# Patient Record
Sex: MALE | URBAN - METROPOLITAN AREA
[De-identification: names, ages, dates, MRNs, and addresses within clinical notes are randomized per-mention and may not be internally consistent; named-entity substitution may affect disease eponyms.]

---

## 2024-05-12 SDOH — HEALTH STABILITY: PHYSICAL HEALTH: ON AVERAGE, HOW MANY MINUTES DO YOU ENGAGE IN EXERCISE AT THIS LEVEL?: 0 MIN

## 2024-05-12 SDOH — HEALTH STABILITY: PHYSICAL HEALTH: ON AVERAGE, HOW MANY DAYS PER WEEK DO YOU ENGAGE IN MODERATE TO STRENUOUS EXERCISE (LIKE A BRISK WALK)?: 0 DAYS

## 2024-05-15 ENCOUNTER — OFFICE VISIT (OUTPATIENT)
Dept: FAMILY MEDICINE CLINIC | Facility: CLINIC | Age: 69
End: 2024-05-15
Payer: MEDICARE

## 2024-05-15 VITALS
TEMPERATURE: 98 F | HEART RATE: 84 BPM | DIASTOLIC BLOOD PRESSURE: 62 MMHG | BODY MASS INDEX: 25.31 KG/M2 | RESPIRATION RATE: 14 BRPM | SYSTOLIC BLOOD PRESSURE: 102 MMHG | WEIGHT: 191 LBS | OXYGEN SATURATION: 98 % | HEIGHT: 73 IN

## 2024-05-15 DIAGNOSIS — S24.103S SPINAL CORD INJURY AT T9 LEVEL, SEQUELA (HCC): ICD-10-CM

## 2024-05-15 DIAGNOSIS — S32.001S CLOSED BURST FRACTURE OF LUMBAR VERTEBRA, SEQUELA: ICD-10-CM

## 2024-05-15 DIAGNOSIS — S22.071A BURST FRACTURE OF T9 VERTEBRA (HCC): ICD-10-CM

## 2024-05-15 DIAGNOSIS — S22.43XS CLOSED FRACTURE OF MULTIPLE RIBS OF BOTH SIDES, SEQUELA: ICD-10-CM

## 2024-05-15 DIAGNOSIS — G89.4 CHRONIC PAIN SYNDROME: ICD-10-CM

## 2024-05-15 DIAGNOSIS — N31.9 NEUROGENIC BLADDER: Primary | ICD-10-CM

## 2024-05-15 DIAGNOSIS — G82.20 PARAPLEGIA (HCC): ICD-10-CM

## 2024-05-15 DIAGNOSIS — I10 PRIMARY HYPERTENSION: ICD-10-CM

## 2024-05-15 DIAGNOSIS — M62.838 MUSCLE SPASTICITY: ICD-10-CM

## 2024-05-15 PROBLEM — S24.103A: Status: ACTIVE | Noted: 2024-05-15

## 2024-05-15 PROBLEM — S32.001A CLOSED BURST FRACTURE OF LUMBAR VERTEBRA (HCC): Status: ACTIVE | Noted: 2024-05-15

## 2024-05-15 PROBLEM — S22.43XA MULTIPLE CLOSED FRACTURES OF RIBS OF BOTH SIDES: Status: ACTIVE | Noted: 2024-05-15

## 2024-05-15 PROCEDURE — 1123F ACP DISCUSS/DSCN MKR DOCD: CPT | Performed by: STUDENT IN AN ORGANIZED HEALTH CARE EDUCATION/TRAINING PROGRAM

## 2024-05-15 PROCEDURE — 99204 OFFICE O/P NEW MOD 45 MIN: CPT | Performed by: STUDENT IN AN ORGANIZED HEALTH CARE EDUCATION/TRAINING PROGRAM

## 2024-05-15 PROCEDURE — 3074F SYST BP LT 130 MM HG: CPT | Performed by: STUDENT IN AN ORGANIZED HEALTH CARE EDUCATION/TRAINING PROGRAM

## 2024-05-15 PROCEDURE — 3078F DIAST BP <80 MM HG: CPT | Performed by: STUDENT IN AN ORGANIZED HEALTH CARE EDUCATION/TRAINING PROGRAM

## 2024-05-15 RX ORDER — VITAMIN B COMPLEX
1 TABLET ORAL DAILY
COMMUNITY

## 2024-05-15 RX ORDER — RIBOFLAVIN (VITAMIN B2) 100 MG
500 TABLET ORAL DAILY
COMMUNITY
Start: 2020-11-25

## 2024-05-15 RX ORDER — DANTROLENE SODIUM 25 MG/1
25 CAPSULE ORAL 2 TIMES DAILY
COMMUNITY
End: 2024-05-15 | Stop reason: SDUPTHER

## 2024-05-15 RX ORDER — LISINOPRIL 10 MG/1
10 TABLET ORAL DAILY
COMMUNITY
Start: 2024-04-05

## 2024-05-15 RX ORDER — DIAPER,BRIEF,ADULT, DISPOSABLE
1200 EACH MISCELLANEOUS DAILY
COMMUNITY

## 2024-05-15 RX ORDER — MAGNESIUM CARB/ALUMINUM HYDROX 105-160MG
1 TABLET,CHEWABLE ORAL
COMMUNITY

## 2024-05-15 RX ORDER — OXYCODONE HYDROCHLORIDE 5 MG/1
5 TABLET ORAL DAILY
Qty: 30 TABLET | Refills: 0 | Status: SHIPPED | OUTPATIENT
Start: 2024-05-15 | End: 2024-06-14

## 2024-05-15 RX ORDER — OMEGA-3S/DHA/EPA/FISH OIL/D3 300MG-1000
5000 CAPSULE ORAL DAILY
COMMUNITY

## 2024-05-15 RX ORDER — BISACODYL 10 MG
10 SUPPOSITORY, RECTAL RECTAL DAILY
COMMUNITY

## 2024-05-15 RX ORDER — OXYCODONE HYDROCHLORIDE 5 MG/1
5 TABLET ORAL DAILY
COMMUNITY
Start: 2020-11-24 | End: 2024-05-15 | Stop reason: SDUPTHER

## 2024-05-15 RX ORDER — TROSPIUM CHLORIDE 20 MG/1
20 TABLET, FILM COATED ORAL 2 TIMES DAILY
Qty: 180 TABLET | Refills: 3 | Status: SHIPPED | OUTPATIENT
Start: 2024-05-15

## 2024-05-15 RX ORDER — DANTROLENE SODIUM 25 MG/1
25 CAPSULE ORAL 2 TIMES DAILY
Qty: 180 CAPSULE | Refills: 3 | Status: SHIPPED | OUTPATIENT
Start: 2024-05-15

## 2024-05-15 RX ORDER — OMEGA-3/DHA/EPA/FISH OIL 60 MG-90MG
500 CAPSULE ORAL DAILY
COMMUNITY

## 2024-05-15 RX ORDER — TROSPIUM CHLORIDE 20 MG/1
20 TABLET, FILM COATED ORAL 2 TIMES DAILY
COMMUNITY
End: 2024-05-15 | Stop reason: SDUPTHER

## 2024-05-15 SDOH — ECONOMIC STABILITY: FOOD INSECURITY: WITHIN THE PAST 12 MONTHS, THE FOOD YOU BOUGHT JUST DIDN'T LAST AND YOU DIDN'T HAVE MONEY TO GET MORE.: NEVER TRUE

## 2024-05-15 SDOH — ECONOMIC STABILITY: FOOD INSECURITY: WITHIN THE PAST 12 MONTHS, YOU WORRIED THAT YOUR FOOD WOULD RUN OUT BEFORE YOU GOT MONEY TO BUY MORE.: NEVER TRUE

## 2024-05-15 SDOH — ECONOMIC STABILITY: INCOME INSECURITY: HOW HARD IS IT FOR YOU TO PAY FOR THE VERY BASICS LIKE FOOD, HOUSING, MEDICAL CARE, AND HEATING?: NOT HARD AT ALL

## 2024-05-15 SDOH — ECONOMIC STABILITY: HOUSING INSECURITY
IN THE LAST 12 MONTHS, WAS THERE A TIME WHEN YOU DID NOT HAVE A STEADY PLACE TO SLEEP OR SLEPT IN A SHELTER (INCLUDING NOW)?: NO

## 2024-05-15 ASSESSMENT — ENCOUNTER SYMPTOMS
NAUSEA: 0
SORE THROAT: 0
RHINORRHEA: 0
EYE PAIN: 0
ABDOMINAL PAIN: 0
VOMITING: 0
COUGH: 0
SHORTNESS OF BREATH: 0
DIARRHEA: 0

## 2024-05-15 ASSESSMENT — PATIENT HEALTH QUESTIONNAIRE - PHQ9
SUM OF ALL RESPONSES TO PHQ QUESTIONS 1-9: 0
2. FEELING DOWN, DEPRESSED OR HOPELESS: NOT AT ALL
SUM OF ALL RESPONSES TO PHQ QUESTIONS 1-9: 0
1. LITTLE INTEREST OR PLEASURE IN DOING THINGS: NOT AT ALL
SUM OF ALL RESPONSES TO PHQ9 QUESTIONS 1 & 2: 0

## 2024-05-15 NOTE — PROGRESS NOTES
Franklyn Campo is a 68 y.o. male (: 1955) presenting to address:    Chief Complaint   Patient presents with    New Patient     Referral to podiatry; transferred from [place]; needs neurology referral       Vitals:    05/15/24 1433   BP: 102/62   Pulse: 84   Resp: 14   Temp: 98 °F (36.7 °C)   SpO2: 98%       \"Have you been to the ER, urgent care clinic since your last visit?  Hospitalized since your last visit?\"    NO    “Have you seen or consulted any other health care providers outside of [facility] since your last visit?”    YES - When: approximately 3 months ago.  Where and Why: dermatology.    “Have you had a colorectal cancer screening such as a colonoscopy/FIT/Cologuard?    YES - Type: Colonoscopy - Where: 2017 or 2018 and to repeat in 10 years Nurse/CMA to request most recent records if not in the chart     No colonoscopy on file  No cologuard on file  No FIT/FOBT on file   No flexible sigmoidoscopy on file              
Judgment normal.           ASSESSMENT and PLAN    Franklyn was seen today for new patient.    Diagnoses and all orders for this visit:    Neurogenic bladder  -     trospium (SANCTURA) 20 MG tablet; Take 1 tablet by mouth 2 times daily    Muscle spasticity  -     dantrolene (DANTRIUM) 25 MG capsule; Take 1 capsule by mouth 2 times daily  -     [facility]    Chronic pain syndrome  -     oxyCODONE (ROXICODONE) 5 MG immediate release tablet; Take 1 tablet by mouth daily for 30 days. Max Daily Amount: 5 mg       Assessment & Plan  1. Hypertension.  The patient's hypertension is currently well-managed with the administration of lisinopril 10 mg. A discussion was held regarding the possibility of gradually discontinuing lisinopril, contingent upon the patient's preference. He could consider reducing his dosage to 5 mg daily or completely discontinuing it, and to monitor his blood pressure daily over the ensuing 2 to 4 weeks. Should his blood pressure become uncontrolled, he will inform me, at which point we will recommence his lisinopril regimen.    2. Paraplegia with neurogenic bladder, neurogenic bowel, and muscle spasticity.  The patient is currently wheelchair-bound and manages most of his activities of daily living independently. He is living alone and is managing well. His current medication regimen includes Dantrolene for muscle spasticity and Sanctura for neurogenic bladder. He self-catheterizes every 5 to 6 hours using a 14-Wolof calister with a [place] brand straight catheter. He occasionally uses a self-containing catheter kit during community activities. He has previously tried nortriptyline, baclofen, gabapentin, and tizanidine, but discontinued due to side effects. Currently, he is tolerating dantrolene. For his spasticity, he has previously received Botox injections with PMR. I will initiate physical therapy to address his spasticity, as he experiences

## 2024-05-23 ENCOUNTER — HOSPITAL ENCOUNTER (OUTPATIENT)
Facility: HOSPITAL | Age: 69
Setting detail: RECURRING SERIES
Discharge: HOME | End: 2024-05-26
Payer: MEDICARE

## 2024-05-23 PROCEDURE — 97140 MANUAL THERAPY 1/> REGIONS: CPT

## 2024-05-23 PROCEDURE — 97161 PT EVAL LOW COMPLEX 20 MIN: CPT

## 2024-05-23 NOTE — PROGRESS NOTES
PHYSICAL / OCCUPATIONAL THERAPY - DAILY TREATMENT NOTE (updated )    Patient Name: Franklyn Campo    Date: 2024    : 1955  Insurance: Payor: MEDICARE / Plan: MEDICARE PART A AND B / Product Type: *No Product type* /      Patient  verified yes     Visit #   Current / Total 1 10-20   Time   In / Out 220 305   Pain   In / Out - -   Subjective Functional Status/Changes: See Eval/ POC         TREATMENT AREA =  Left leg pain [M79.605]  Right leg pain [M79.604]    OBJECTIVE        Therapeutic Procedures:  Tx Min Billable or 1:1 Min (if diff from Tx Min) Procedure, Rationale, Specifics   10  02429 Manual Therapy (timed):  decrease pain, increase ROM, increase tissue extensibility, and decrease trigger points to improve patient's ability to progress to PLOF and address remaining functional goals.  The manual therapy interventions were performed at a separate and distinct time from the therapeutic activities interventions . (see flow sheet as applicable)     Details if applicable:                             [facility] Totals Reminder: bill using total billable min of TIMED therapeutic procedures (example: do not include dry needle or estim unattended, both untimed codes, in totals to left)  8-22 min = 1 unit; 23-37 min = 2 units; 38-52 min = 3 units; 53-67 min = 4 units; 68-82 min = 5 units   Total Total     [x]  Patient Education billed concurrently with other procedures   [x] Review HEP    [] Progressed/Changed HEP, detail:    [] Other detail:       Objective Information/Functional Measures/Assessment: [x]  See POC    Goals:  [x]  See POC    Next PN/ RC: 24  Auth due:     PLAN  yes Continue plan of care  []  Upgrade activities as tolerated  []  Discharge due to :  []  Other:    Shayy Brown PT    2024    4:56 PM    Future Appointments   Date Time Provider Department Center   2024  2:20 PM Analia Kasper, PT [facility]   6/3/2024  3:00 PM Shayy Brown PT [facility]   2024  3:00 PM

## 2024-05-23 NOTE — PROGRESS NOTES
[facility]   [address], [address], [place] [address] Ph: [phone] Fx: [phone]  Plan of Care / Statement of Necessity for Physical Therapy Services     Patient Name: Franklyn Campo : 1955   Medical   Diagnosis: Left leg pain [M79.605]  Right leg pain [M79.604] Treatment Diagnosis: Left leg pain [M79.605]  Right leg pain [M79.604]  B LE spasticity   Onset Date: Worse in past 6 months     Referral Source: Alonzo Saul Start of Care (SOC): 2024   Prior Hospitalization: See medical history Provider #: [id number]   Prior Level of Function: Better mobility of LE to allow for reasonable sleeping position   Comorbidities: 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  yo male with Dx of Left leg pain [M79.605]  Right leg pain [M79.604].   He reports above medical history.  Over the past 6-8 months, he states he has had increased spacticity of the LEs especially problematic at night.  He reports that the knees no longer fully extend and that the hips draw upwards (into flex/ ER), making it difficult to rest.   Recent treatments have included BOTOX without benefit.  He states that he no longer gets benefit from the muscle relaxers.  [facility]  His goals are to remain independent.  Observation: arrives in wheelchair for mobility.  Independent with transfers.  Noted spasticity of B LE into flexion  ROM measures: R Knee ROM: , L knee ROM: 55=431 degrees.  Able to passively achieve hips to neutral extension      Evaluation Complexity:

## 2024-05-31 ENCOUNTER — HOSPITAL ENCOUNTER (OUTPATIENT)
Facility: HOSPITAL | Age: 69
Setting detail: RECURRING SERIES
End: 2024-05-31
Payer: MEDICARE

## 2024-06-03 ENCOUNTER — HOSPITAL ENCOUNTER (OUTPATIENT)
Facility: HOSPITAL | Age: 69
Setting detail: RECURRING SERIES
Discharge: HOME | End: 2024-06-06
Payer: MEDICARE

## 2024-06-03 PROCEDURE — 97140 MANUAL THERAPY 1/> REGIONS: CPT

## 2024-06-03 PROCEDURE — 97112 NEUROMUSCULAR REEDUCATION: CPT

## 2024-06-03 PROCEDURE — 97110 THERAPEUTIC EXERCISES: CPT

## 2024-06-03 NOTE — PROGRESS NOTES
PHYSICAL / OCCUPATIONAL THERAPY - DAILY TREATMENT NOTE (updated )    Patient Name: Farnklyn Campo    Date: 6/3/2024    : 1955  Insurance: Payor: MEDICARE / Plan: MEDICARE PART A AND B / Product Type: *No Product type* /      Patient  verified YES   Visit #   Current / Total 2 10-20   Time   In / Out 300 343   Pain   In / Out 3- R phlank pain 0   Subjective Functional Status/Changes: Goals: pec strength, promote muscle atrophy of legs       TREATMENT AREA =  Left leg pain [M79.605]  Right leg pain [M79.604]    OBJECTIVE      Therapeutic Procedures:  Tx Min Billable or 1:1 Min (if diff from Tx Min) Procedure, Rationale, Specifics   10  32830 Therapeutic Exercise (timed):  increase ROM, strength, coordination, balance, and proprioception to improve patient's ability to progress to PLOF and address remaining functional goals. (see flow sheet as applicable)     Details if applicable:       15  08973 Neuromuscular Re-Education (timed):  improve balance, coordination, kinesthetic sense, posture, core stability and proprioception to improve patient's ability to develop conscious control of individual muscles and awareness of position of extremities in order to progress to PLOF and address remaining functional goals. (see flow sheet as applicable)     Details if applicable:       48722 Therapeutic Activity (timed):  use of dynamic activities replicating functional movements to increase ROM, strength, coordination, balance, and proprioception in order to improve patient's ability to progress to PLOF and address remaining functional goals.  (see flow sheet as applicable)     Details if applicable:     15  47597 Manual Therapy (timed):  decrease pain, increase ROM, increase tissue extensibility, and decrease trigger points to improve patient's ability to progress to PLOF and address remaining functional goals.  The manual therapy interventions were performed at a separate and distinct time from the therapeutic

## 2024-06-05 ENCOUNTER — HOSPITAL ENCOUNTER (OUTPATIENT)
Facility: HOSPITAL | Age: 69
Setting detail: RECURRING SERIES
Discharge: HOME | End: 2024-06-08
Payer: MEDICARE

## 2024-06-05 PROCEDURE — 97110 THERAPEUTIC EXERCISES: CPT

## 2024-06-05 PROCEDURE — 97140 MANUAL THERAPY 1/> REGIONS: CPT

## 2024-06-05 PROCEDURE — 97530 THERAPEUTIC ACTIVITIES: CPT

## 2024-06-05 NOTE — PROGRESS NOTES
Re-Education (timed):  improve balance, coordination, kinesthetic sense, posture, core stability and proprioception to improve patient's ability to develop conscious control of individual muscles and awareness of position of extremities in order to progress to PLOF and address remaining functional goals. (see flow sheet as applicable)     Details if applicable:     15 15 85554 Manual Therapy (timed):  decrease pain, increase ROM, and increase tissue extensibility to improve patient's ability to progress to PLOF and address remaining functional goals.  The manual therapy interventions were performed at a separate and distinct time from the therapeutic activities interventions . (see flow sheet as applicable)     Details if applicable:  passive stretching on   15 15 88564 Therapeutic Activity (timed):  use of dynamic activities replicating functional movements to increase ROM, strength, coordination, balance, and proprioception in order to improve patient's ability to progress to PLOF and address remaining functional goals.  (see flow sheet as applicable)     Details if applicable:            Details if applicable:     50 50 [facility] Totals Reminder: bill using total billable min of TIMED therapeutic procedures (example: do not include dry needle or estim unattended, both untimed codes, in totals to left)  8-22 min = 1 unit; 23-37 min = 2 units; 38-52 min = 3 units; 53-67 min = 4 units; 68-82 min = 5 units   Total Total     [x]  Patient Education billed concurrently with other procedures   [x] Review HEP    [] Progressed/Changed HEP, detail:    [] Other detail:       Objective Information/Functional Measures/Assessment    Prone laying for 30min  Passive stretching of hip flexors and hamstrings    Patient will continue to benefit from skilled PT / OT services to modify and progress therapeutic interventions, analyze and address functional mobility deficits, analyze and address ROM deficits, analyze and address strength

## 2024-06-10 ENCOUNTER — HOSPITAL ENCOUNTER (OUTPATIENT)
Facility: HOSPITAL | Age: 69
Setting detail: RECURRING SERIES
Discharge: HOME | End: 2024-06-13
Payer: MEDICARE

## 2024-06-10 PROCEDURE — 97112 NEUROMUSCULAR REEDUCATION: CPT

## 2024-06-10 PROCEDURE — 97140 MANUAL THERAPY 1/> REGIONS: CPT

## 2024-06-10 PROCEDURE — 97110 THERAPEUTIC EXERCISES: CPT

## 2024-06-10 NOTE — PROGRESS NOTES
PHYSICAL / OCCUPATIONAL THERAPY - DAILY TREATMENT NOTE    Patient Name: Franklyn Campo    Date: 6/10/2024    : 1955  Insurance: Payor: MEDICARE / Plan: MEDICARE PART A AND B / Product Type: *No Product type* /      Patient  verified Yes     Visit #   Current / Total 4 12   Time   In / Out 5:46 6:30   Pain   In / Out 0-3 0-3   Subjective Functional Status/Changes: Thinks tone is a bit better in L leg at night. States can't lie prone in hospital bed because it is narrower than the table in the clinic and it requires his legs to be straightened out prior and he needs help with maneuvering legs. Reached out to company about device to aid in walking, thinks it's called [employer].      TREATMENT AREA =  Left leg pain [M79.605]  Right leg pain [M79.604]     OBJECTIVE    Therapeutic Procedures:    21181 Therapeutic Exercise (timed):  increase ROM, strength, coordination, balance, and proprioception to improve patient's ability to progress to PLOF and address remaining functional goals.   Tx Min Billable or 1:1 Min   (if diff from Tx Min) Details:   14 8 See flow sheet as applicable     52895 Neuromuscular Re-Education (timed):  improve balance, coordination, kinesthetic sense, posture, core stability and proprioception to improve patient's ability to develop conscious control of individual muscles and awareness of position of extremities in order to progress to PLOF and address remaining functional goals.   Tx Min Billable or 1:1 Min   (if diff from Tx Min) Details:   15  See flow sheet as applicable     13187 Manual Therapy (timed):  increase ROM and increase tissue extensibility to improve patient's ability to progress to PLOF and address remaining functional goals.  The manual therapy interventions were performed at a separate and distinct time from the therapeutic activities interventions .   Tx Min Billable or 1:1 Min   (if diff from Tx Min) Details:   15  Gentle hip flexor stretch, knee extension stretch       44

## 2024-06-13 ENCOUNTER — HOSPITAL ENCOUNTER (OUTPATIENT)
Facility: HOSPITAL | Age: 69
Setting detail: RECURRING SERIES
Discharge: HOME | End: 2024-06-16
Payer: MEDICARE

## 2024-06-13 PROCEDURE — 97110 THERAPEUTIC EXERCISES: CPT

## 2024-06-13 PROCEDURE — 97112 NEUROMUSCULAR REEDUCATION: CPT

## 2024-06-13 PROCEDURE — 97140 MANUAL THERAPY 1/> REGIONS: CPT

## 2024-06-13 NOTE — PROGRESS NOTES
PHYSICAL / OCCUPATIONAL THERAPY - DAILY TREATMENT NOTE (updated )    Patient Name: Franklyn Campo    Date: 2024    : 1955  Insurance: Payor: MEDICARE / Plan: MEDICARE PART A AND B / Product Type: *No Product type* /      Patient  verified YES   Visit #   Current / Total 5 12   Time   In / Out 350 440   Pain   In / Out 3-4 3-4   Subjective Functional Status/Changes: L LE doesn't draw up as fast of as much as prior to PT/  R LE is about the same       TREATMENT AREA =  Left leg pain [M79.605]  Right leg pain [M79.604]    OBJECTIVE    M  Therapeutic Procedures:  Tx Min Billable or 1:1 Min (if diff from Tx Min) Procedure, Rationale, Specifics   10 10 81927 Therapeutic Exercise (timed):  increase ROM, strength, coordination, balance, and proprioception to improve patient's ability to progress to PLOF and address remaining functional goals. (see flow sheet as applicable)     Details if applicable:       15 15 04792 Neuromuscular Re-Education (timed):  improve balance, coordination, kinesthetic sense, posture, core stability and proprioception to improve patient's ability to develop conscious control of individual muscles and awareness of position of extremities in order to progress to PLOF and address remaining functional goals. (see flow sheet as applicable)     Details if applicable:     10 - 81762 Therapeutic Activity (timed):  use of dynamic activities replicating functional movements to increase ROM, strength, coordination, balance, and proprioception in order to improve patient's ability to progress to PLOF and address remaining functional goals.  (see flow sheet as applicable)     Details if applicable:     15 55 82210 Manual Therapy (timed):  decrease pain, increase ROM, increase tissue extensibility, and decrease trigger points to improve patient's ability to progress to PLOF and address remaining functional goals.  The manual therapy interventions were performed at a separate and distinct time

## 2024-06-14 DIAGNOSIS — S22.43XS CLOSED FRACTURE OF MULTIPLE RIBS OF BOTH SIDES, SEQUELA: ICD-10-CM

## 2024-06-14 DIAGNOSIS — G82.20 PARAPLEGIA (HCC): ICD-10-CM

## 2024-06-14 DIAGNOSIS — S22.071A BURST FRACTURE OF T9 VERTEBRA (HCC): ICD-10-CM

## 2024-06-14 DIAGNOSIS — S32.001S CLOSED BURST FRACTURE OF LUMBAR VERTEBRA, SEQUELA: ICD-10-CM

## 2024-06-14 DIAGNOSIS — G89.4 CHRONIC PAIN SYNDROME: ICD-10-CM

## 2024-06-14 DIAGNOSIS — S24.103S SPINAL CORD INJURY AT T9 LEVEL, SEQUELA (HCC): ICD-10-CM

## 2024-06-14 RX ORDER — OXYCODONE HYDROCHLORIDE 5 MG/1
5 TABLET ORAL DAILY
Qty: 30 TABLET | Refills: 0 | Status: SHIPPED | OUTPATIENT
Start: 2024-08-14 | End: 2024-09-13

## 2024-06-14 RX ORDER — OXYCODONE HYDROCHLORIDE 5 MG/1
5 TABLET ORAL DAILY
Qty: 30 TABLET | Refills: 0 | Status: SHIPPED | OUTPATIENT
Start: 2024-07-14 | End: 2024-08-13

## 2024-06-14 RX ORDER — OXYCODONE HYDROCHLORIDE 5 MG/1
5 TABLET ORAL DAILY
Qty: 30 TABLET | Refills: 0 | Status: SHIPPED | OUTPATIENT
Start: 2024-06-14 | End: 2024-07-14

## 2024-06-14 NOTE — TELEPHONE ENCOUNTER
Patient was notified and understood he will have 3 months worth of Oxycodone at the pharmacy to  monthly since it's a controlled substance.

## 2024-06-14 NOTE — TELEPHONE ENCOUNTER
Requested Prescriptions     Pending Prescriptions Disp Refills    oxyCODONE (ROXICODONE) 5 MG immediate release tablet 30 tablet 0     Sig: Take 1 tablet by mouth daily for 30 days. Max Daily Amount: 5 mg      Last seen: 5/15/24  Next seen: 8/15/24    Last filled 5/15/24 Qty 30 w/0 refills

## 2024-06-17 ENCOUNTER — APPOINTMENT (OUTPATIENT)
Facility: HOSPITAL | Age: 69
End: 2024-06-17
Payer: MEDICARE

## 2024-06-20 ENCOUNTER — HOSPITAL ENCOUNTER (OUTPATIENT)
Facility: HOSPITAL | Age: 69
Setting detail: RECURRING SERIES
Discharge: HOME | End: 2024-06-23
Payer: MEDICARE

## 2024-06-20 PROCEDURE — 97140 MANUAL THERAPY 1/> REGIONS: CPT

## 2024-06-20 PROCEDURE — 97110 THERAPEUTIC EXERCISES: CPT

## 2024-06-20 PROCEDURE — 97112 NEUROMUSCULAR REEDUCATION: CPT

## 2024-06-20 NOTE — PROGRESS NOTES
PHYSICAL / OCCUPATIONAL THERAPY - DAILY TREATMENT NOTE (updated )    Patient Name: Franklyn Campo    Date: 2024    : 1955  Insurance: Payor: MEDICARE / Plan: MEDICARE PART A AND B / Product Type: *No Product type* /      Patient  verified YES   Visit #   Current / Total 6 12   Time   In / Out 500 555   Pain   In / Out 0 0   Subjective Functional Status/Changes: Aggravated muscle in right scap region.  Hard to do some transfers    L LE 50% improved in tone, R LE minimally better  Improved PROM with knee ext       TREATMENT AREA =  Left leg pain [M79.605]  Right leg pain [M79.604]    OBJECTIVE        Therapeutic Procedures:  Tx Min Billable or 1:1 Min (if diff from Tx Min) Procedure, Rationale, Specifics   15  54795 Therapeutic Exercise (timed):  increase ROM, strength, coordination, balance, and proprioception to improve patient's ability to progress to PLOF and address remaining functional goals. (see flow sheet as applicable)     Details if applicable:       15  90279 Neuromuscular Re-Education (timed):  improve balance, coordination, kinesthetic sense, posture, core stability and proprioception to improve patient's ability to develop conscious control of individual muscles and awareness of position of extremities in order to progress to PLOF and address remaining functional goals. (see flow sheet as applicable)     Details if applicable:       72073 Therapeutic Activity (timed):  use of dynamic activities replicating functional movements to increase ROM, strength, coordination, balance, and proprioception in order to improve patient's ability to progress to PLOF and address remaining functional goals.  (see flow sheet as applicable)     Details if applicable:     25  32454 Manual Therapy (timed):  decrease pain, increase ROM, increase tissue extensibility, and decrease trigger points to improve patient's ability to progress to PLOF and address remaining functional goals.  The manual therapy

## 2024-06-24 ENCOUNTER — HOSPITAL ENCOUNTER (OUTPATIENT)
Facility: HOSPITAL | Age: 69
Setting detail: RECURRING SERIES
Discharge: HOME | End: 2024-06-27
Payer: MEDICARE

## 2024-06-24 PROCEDURE — 97140 MANUAL THERAPY 1/> REGIONS: CPT

## 2024-06-24 PROCEDURE — 97110 THERAPEUTIC EXERCISES: CPT

## 2024-06-24 PROCEDURE — 97112 NEUROMUSCULAR REEDUCATION: CPT

## 2024-06-24 NOTE — PROGRESS NOTES
PHYSICAL / OCCUPATIONAL THERAPY - DAILY TREATMENT NOTE (updated )    Patient Name: Franklyn Campo    Date: 2024    : 1955  Insurance: Payor: MEDICARE / Plan: MEDICARE PART A AND B / Product Type: *No Product type* /      Patient  verified YES   Visit #   Current / Total 7 12   Time   In / Out 500 556   Pain   In / Out 3-4 3-4   Subjective Functional Status/Changes: See note       TREATMENT AREA =  Left leg pain [M79.605]  Right leg pain [M79.604]    OBJECTIVE    Therapeutic Procedures:  Tx Min Billable or 1:1 Min (if diff from Tx Min) Procedure, Rationale, Specifics   15  34088 Therapeutic Exercise (timed):  increase ROM, strength, coordination, balance, and proprioception to improve patient's ability to progress to PLOF and address remaining functional goals. (see flow sheet as applicable)     Details if applicable:       15  88057 Neuromuscular Re-Education (timed):  improve balance, coordination, kinesthetic sense, posture, core stability and proprioception to improve patient's ability to develop conscious control of individual muscles and awareness of position of extremities in order to progress to PLOF and address remaining functional goals. (see flow sheet as applicable)     Details if applicable:       00902 Therapeutic Activity (timed):  use of dynamic activities replicating functional movements to increase ROM, strength, coordination, balance, and proprioception in order to improve patient's ability to progress to PLOF and address remaining functional goals.  (see flow sheet as applicable)     Details if applicable:     25  97841 Manual Therapy (timed):  decrease pain, increase ROM, increase tissue extensibility, and decrease trigger points to improve patient's ability to progress to PLOF and address remaining functional goals.  The manual therapy interventions were performed at a separate and distinct time from the therapeutic activities interventions . (see flow sheet as applicable)

## 2024-06-24 NOTE — PROGRESS NOTES
[facility]   [address], [address], [place] [address] Ph: [phone] Fx: [phone]  PHYSICAL THERAPY PROGRESS NOTE  Patient Name: Franklyn Campo : 1955   Treatment/Medical Diagnosis: Left leg pain [M79.605]  Right leg pain [M79.604]   Referral Source: Alonzo Saul*     Date of Initial Visit: 24 Attended Visits: 7 Missed Visits: 1     SUMMARY OF TREATMENT  Patient has been seen in PT for 7 visits.  Treatment has consisted of 10372 Therapeutic Exercise, 99958 Neuromuscular Re-Education, 54275 Manual Therapy, and 82765 Therapeutic Activity.  Patient has also been instructed in HEP, activity modification, posture/ body mechanics.      CURRENT STATUS  Patient is progressing steadily through this course of PT.  He demonstrates increase in knee extension.  He does report continued drawing of the LEs into flexion when lying supine, but to a lesser extent, primarily on the left.      PRIOR GOALS  1 Patient to report >= 60% improvement in symptoms with ADLs.  2 Patient will be independent with finalized HEP/ self maintenance.  3 Improve B knee extension ROM <= 15 degrees for improved positioning for sleep       Status at last Eval: n/a  Current Status: L LE 50% improved in tone, R LE minimally better   Goal Met?   progressing    2.  Status at last Eval: n/a  Current Status: difficutly performing passive stretch at home  Goal Met?  no    3.   Status at last Eval: R Knee ROM: , L knee ROM: 14=813 degrees. Able to passively achieve hips to neutral extension   Current Status: R knee ext= 20 degrees from full extension, L knee ext= 21 degrees from full extension  Goal Met?   progressing        Payor: MEDICARE / Plan: MEDICARE PART A AND B / Product Type: *No Product type* /     Medicare, cannot change goals, cannot adjust frequency/duration, no signature required   Reporting Period: (date from last Prog Note/Eval to current Prog

## 2024-06-27 ENCOUNTER — HOSPITAL ENCOUNTER (OUTPATIENT)
Facility: HOSPITAL | Age: 69
Setting detail: RECURRING SERIES
Discharge: HOME | End: 2024-06-30
Payer: MEDICARE

## 2024-06-27 PROCEDURE — 97112 NEUROMUSCULAR REEDUCATION: CPT

## 2024-06-27 PROCEDURE — 97110 THERAPEUTIC EXERCISES: CPT

## 2024-06-27 PROCEDURE — 97140 MANUAL THERAPY 1/> REGIONS: CPT

## 2024-06-27 NOTE — PROGRESS NOTES
PHYSICAL / OCCUPATIONAL THERAPY - DAILY TREATMENT NOTE    Patient Name: Franklyn Campo    Date: 2024    : 1955  Insurance: Payor: MEDICARE / Plan: MEDICARE PART A AND B / Product Type: *No Product type* /      Patient  verified Yes     Visit #   Current / Total 8 12   Time   In / Out 5:45 6:37   Pain   In / Out - --   Subjective Functional Status/Changes: Continues to have increased spasticity R>L. Doesn't get in bed during the day.      TREATMENT AREA =  Left leg pain [M79.605]  Right leg pain [M79.604]     OBJECTIVE    Therapeutic Procedures:    30673 Therapeutic Exercise (timed):  increase ROM, strength, coordination, balance, and proprioception to improve patient's ability to progress to PLOF and address remaining functional goals.   Tx Min Billable or 1:1 Min   (if diff from Tx Min) Details:   15  See flow sheet as applicable     34013 Neuromuscular Re-Education (timed):  improve balance, coordination, kinesthetic sense, posture, core stability and proprioception to improve patient's ability to develop conscious control of individual muscles and awareness of position of extremities in order to progress to PLOF and address remaining functional goals.   Tx Min Billable or 1:1 Min   (if diff from Tx Min) Details:   15  See flow sheet as applicable     43343 Manual Therapy (timed):  decrease pain, increase ROM, and increase tissue extensibility to improve patient's ability to progress to PLOF and address remaining functional goals.  The manual therapy interventions were performed at a separate and distinct time from the therapeutic activities interventions .   Tx Min Billable or 1:1 Min   (if diff from Tx Min) Details:   22  Manual stretching or hip flexors and knee extensors, positioning       52  [facility] Totals Reminder: bill using total billable min of TIMED therapeutic procedures (example: do not include dry needle or estim unattended, both untimed codes, in totals to left)  8-22 min = 1 unit; 23-37

## 2024-07-01 ENCOUNTER — HOSPITAL ENCOUNTER (OUTPATIENT)
Facility: HOSPITAL | Age: 69
Setting detail: RECURRING SERIES
Discharge: HOME | End: 2024-07-04
Payer: MEDICARE

## 2024-07-01 PROCEDURE — 97110 THERAPEUTIC EXERCISES: CPT

## 2024-07-01 PROCEDURE — 97140 MANUAL THERAPY 1/> REGIONS: CPT

## 2024-07-01 PROCEDURE — 97530 THERAPEUTIC ACTIVITIES: CPT

## 2024-07-01 NOTE — PROGRESS NOTES
PHYSICAL / OCCUPATIONAL THERAPY - DAILY TREATMENT NOTE (updated )    Patient Name: Franklyn Campo    Date: 2024    : 1955  Insurance: Payor: MEDICARE / Plan: MEDICARE PART A AND B / Product Type: *No Product type* /      Patient  verified Yes   Visit #   Current / Total 9 12   Time   In / Out 5:40 6:30   Pain   In / Out 0 0   Subjective Functional Status/Changes: Pt reports difficulty with sleeping.      TREATMENT AREA =  Left leg pain [M79.605]  Right leg pain [M79.604]    OBJECTIVE         Therapeutic Procedures:  Tx Min Billable or 1:1 Min (if diff from Tx Min) Procedure, Rationale, Specifics   20  90367 Therapeutic Exercise (timed):  increase ROM, strength, coordination, balance, and proprioception to improve patient's ability to progress to PLOF and address remaining functional goals. (see flow sheet as applicable)     Details if applicable:       15 15 36790 Neuromuscular Re-Education (timed):  improve balance, coordination, kinesthetic sense, posture, core stability and proprioception to improve patient's ability to develop conscious control of individual muscles and awareness of position of extremities in order to progress to PLOF and address remaining functional goals. (see flow sheet as applicable)     Details if applicable:     15 15 64594 Manual Therapy (timed):  decrease pain, increase ROM, and increase tissue extensibility to improve patient's ability to progress to PLOF and address remaining functional goals.  The manual therapy interventions were performed at a separate and distinct time from the therapeutic activities interventions . (see flow sheet as applicable)     Details if applicable:       59429 Therapeutic Activity (timed):  use of dynamic activities replicating functional movements to increase ROM, strength, coordination, balance, and proprioception in order to improve patient's ability to progress to PLOF and address remaining functional goals.  (see flow sheet as

## 2024-07-09 ENCOUNTER — TELEPHONE (OUTPATIENT)
Dept: FAMILY MEDICINE CLINIC | Facility: CLINIC | Age: 69
End: 2024-07-09

## 2024-07-09 DIAGNOSIS — G82.20 PARAPLEGIA (HCC): Primary | ICD-10-CM

## 2024-07-09 DIAGNOSIS — S32.001S CLOSED BURST FRACTURE OF LUMBAR VERTEBRA, SEQUELA: ICD-10-CM

## 2024-07-09 DIAGNOSIS — S22.071A BURST FRACTURE OF T9 VERTEBRA (HCC): ICD-10-CM

## 2024-07-09 DIAGNOSIS — M62.838 MUSCLE SPASTICITY: ICD-10-CM

## 2024-07-09 DIAGNOSIS — S24.103S SPINAL CORD INJURY AT T9 LEVEL, SEQUELA (HCC): ICD-10-CM

## 2024-07-09 NOTE — TELEPHONE ENCOUNTER
----- Message from Bhavin Gabriel Graham sent at 7/9/2024  2:29 PM EDT -----  Regarding: [facility] Referral Request  [facility] Referral Request    Reason for referral request: Specialty Provider    Specialist/Lab/Test patient is requesting (if known): Rehab specialist    Specialist Phone Number (if applicable):      --------------------------------------------------------------------------------------------------------------------------    Relationship to Patient: Self     Call Back Information: OK to leave message on voicemail  Preferred Call Back Number: Phone [phone]

## 2024-07-09 NOTE — TELEPHONE ENCOUNTER
Pt called requesting to have a referral sent to Dr. Alyssa Talbot #[phone] fx#[phone].

## 2024-07-12 DIAGNOSIS — G89.4 CHRONIC PAIN SYNDROME: ICD-10-CM

## 2024-07-12 DIAGNOSIS — S22.071A BURST FRACTURE OF T9 VERTEBRA (HCC): ICD-10-CM

## 2024-07-12 DIAGNOSIS — S24.103S SPINAL CORD INJURY AT T9 LEVEL, SEQUELA (HCC): ICD-10-CM

## 2024-07-12 DIAGNOSIS — G82.20 PARAPLEGIA (HCC): ICD-10-CM

## 2024-07-12 DIAGNOSIS — S32.001S CLOSED BURST FRACTURE OF LUMBAR VERTEBRA, SEQUELA: ICD-10-CM

## 2024-07-12 DIAGNOSIS — S22.43XS CLOSED FRACTURE OF MULTIPLE RIBS OF BOTH SIDES, SEQUELA: ICD-10-CM

## 2024-07-12 RX ORDER — OXYCODONE HYDROCHLORIDE 5 MG/1
5 TABLET ORAL DAILY
Qty: 30 TABLET | Refills: 0 | OUTPATIENT
Start: 2024-07-12 | End: 2024-08-11

## 2024-07-15 ENCOUNTER — APPOINTMENT (OUTPATIENT)
Facility: HOSPITAL | Age: 69
End: 2024-07-15
Payer: MEDICARE

## 2024-07-18 ENCOUNTER — HOSPITAL ENCOUNTER (OUTPATIENT)
Facility: HOSPITAL | Age: 69
Setting detail: RECURRING SERIES
Discharge: HOME | End: 2024-07-21
Payer: MEDICARE

## 2024-07-18 PROCEDURE — 97112 NEUROMUSCULAR REEDUCATION: CPT

## 2024-07-18 PROCEDURE — 97140 MANUAL THERAPY 1/> REGIONS: CPT

## 2024-07-18 PROCEDURE — 97110 THERAPEUTIC EXERCISES: CPT

## 2024-07-18 NOTE — PROGRESS NOTES
PHYSICAL / OCCUPATIONAL THERAPY - DAILY TREATMENT NOTE (updated )    Patient Name: Franklyn Campo    Date: 2024    : 1955  Insurance: Payor: MEDICARE / Plan: MEDICARE PART A AND B / Product Type: *No Product type* /      Patient  verified YES   Visit #   Current / Total 11 12   Time   In / Out 510 605   Pain   In / Out - -   Subjective Functional Status/Changes: PM&R appt Sept 9.    Legs not much better at night, still draw up.  Doing weight ex for shoulders, trunk.  Overall feel looser.    Taking detraline as muscle relaxant;        TREATMENT AREA =  Left leg pain [M79.605]  Right leg pain [M79.604]    OBJECTIVE      Therapeutic Procedures:  Tx Min Billable or 1:1 Min (if diff from Tx Min) Procedure, Rationale, Specifics   25  49164 Therapeutic Exercise (timed):  increase ROM, strength, coordination, balance, and proprioception to improve patient's ability to progress to PLOF and address remaining functional goals. (see flow sheet as applicable)     Details if applicable:       15  86047 Neuromuscular Re-Education (timed):  improve balance, coordination, kinesthetic sense, posture, core stability and proprioception to improve patient's ability to develop conscious control of individual muscles and awareness of position of extremities in order to progress to PLOF and address remaining functional goals. (see flow sheet as applicable)     Details if applicable:       06641 Therapeutic Activity (timed):  use of dynamic activities replicating functional movements to increase ROM, strength, coordination, balance, and proprioception in order to improve patient's ability to progress to PLOF and address remaining functional goals.  (see flow sheet as applicable)     Details if applicable:     15  24067 Manual Therapy (timed):  decrease pain, increase ROM, increase tissue extensibility, and decrease trigger points to improve patient's ability to progress to PLOF and address remaining functional goals.  The

## 2024-07-22 ENCOUNTER — HOSPITAL ENCOUNTER (OUTPATIENT)
Facility: HOSPITAL | Age: 69
Setting detail: RECURRING SERIES
Discharge: HOME | End: 2024-07-25
Payer: MEDICARE

## 2024-07-22 ENCOUNTER — APPOINTMENT (OUTPATIENT)
Facility: HOSPITAL | Age: 69
End: 2024-07-22
Payer: MEDICARE

## 2024-07-22 PROCEDURE — 97140 MANUAL THERAPY 1/> REGIONS: CPT

## 2024-07-22 PROCEDURE — 97110 THERAPEUTIC EXERCISES: CPT

## 2024-07-22 PROCEDURE — 97112 NEUROMUSCULAR REEDUCATION: CPT

## 2024-07-22 NOTE — THERAPY DISCHARGE
[facility]   [address], [address], [place] [address] Ph: [phone] Fx: [phone]  PHYSICAL THERAPY DISCHARGE NOTE            Patient Name: Franklyn Campo : 1955   Treatment/Medical Diagnosis: Left leg pain [M79.605]  Right leg pain [M79.604]   Referral Source: Alonzo Saul*       Date of Initial Visit: 24 Attended Visits: 12 Missed Visits: 1      SUMMARY OF TREATMENT  Patient has been seen in PT for 12 visits.  Treatment has consisted of 99719 Therapeutic Exercise, 37207 Neuromuscular Re-Education, 68143 Manual Therapy, and 43031 Therapeutic Activity.  Patient has also been instructed in HEP, activity modification, posture/ body mechanics.       CURRENT STATUS  Patient reporting minimal progress through this course of PT.  He reports less  overall tightness of LE musculature He demonstrates increase ROM of knee/ hip extension, but with minimal carryover of spasming/ tone present when lying supine--  LEs continue to draw up       PRIOR GOALS  1 Patient to report >= 60% improvement in symptoms with ADLs.  2 Patient will be independent with finalized HEP/ self maintenance.  3 Improve B knee extension ROM <= 15 degrees for improved positioning for sleep        Status at last Eval: L LE 50% improved in tone, R LE minimally better   Current Status: 20% overall improvement  Goal Met?  no      2.  Status at last Eval: difficutly performing passive stretch at home  Current Status: difficulty performing passive stretch at home  Goal Met?  no     3.   Status at last Eval: R knee ext= 20 degrees from full extension, L knee ext= 21 degrees from full extension  Current Status: Able to passively achieve full extension, Resting range (in prone) L= 13 degrees from full, R= 11 degrees from full  Goal Met?  yes           Payor: MEDICARE / Plan: MEDICARE PART A AND B / Product Type: *No Product type* /      Medicare, cannot change goals, cannot

## 2024-07-22 NOTE — PROGRESS NOTES
PHYSICAL / OCCUPATIONAL THERAPY - DAILY TREATMENT NOTE (updated )    Patient Name: Franklyn Campo    Date: 2024    : 1955  Insurance: Payor: MEDICARE / Plan: MEDICARE PART A AND B / Product Type: *No Product type* /        Patient  verified YES   Visit #   Current / Total 12 12   Time   In / Out 455 550   Pain   In / Out - -   Subjective Functional Status/Changes: See note     TREATMENT AREA =  Left leg pain [M79.605]  Right leg pain [M79.604]    OBJECTIVE      Therapeutic Procedures:  Tx Min Billable or 1:1 Min (if diff from Tx Min) Procedure, Rationale, Specifics   25  81340 Therapeutic Exercise (timed):  increase ROM, strength, coordination, balance, and proprioception to improve patient's ability to progress to PLOF and address remaining functional goals. (see flow sheet as applicable)    15  48677 Neuromuscular Re-Education (timed):  improve balance, coordination, kinesthetic sense, posture, core stability and proprioception to improve patient's ability to develop conscious control of individual muscles and awareness of position of extremities in order to progress to PLOF and address remaining functional goals. (see flow sheet as applicable)    15  27692 Manual Therapy (timed):  increase ROM, increase tissue extensibility, and decrease trigger points to improve patient's ability to progress to PLOF and address remaining functional goals.  The manual therapy interventions were performed at a separate and distinct time from the therapeutic activities interventions . (see flow sheet as applicable)                [facility] Totals Reminder: bill using total billable min of TIMED therapeutic procedures (example: do not include dry needle or estim unattended, both untimed codes, in totals to left)  8-22 min = 1 unit; 23-37 min = 2 units; 38-52 min = 3 units; 53-67 min = 4 units; 68-82 min = 5 units   Total Total     [x]  Patient Education billed concurrently with other procedures   [x] Review HEP    []

## 2024-07-24 ENCOUNTER — APPOINTMENT (OUTPATIENT)
Facility: HOSPITAL | Age: 69
End: 2024-07-24
Payer: MEDICARE

## 2024-08-15 ENCOUNTER — OFFICE VISIT (OUTPATIENT)
Dept: FAMILY MEDICINE CLINIC | Facility: CLINIC | Age: 69
End: 2024-08-15
Payer: MEDICARE

## 2024-08-15 VITALS
OXYGEN SATURATION: 97 % | TEMPERATURE: 98.3 F | BODY MASS INDEX: 25.2 KG/M2 | RESPIRATION RATE: 16 BRPM | DIASTOLIC BLOOD PRESSURE: 60 MMHG | HEIGHT: 73 IN | HEART RATE: 98 BPM | SYSTOLIC BLOOD PRESSURE: 93 MMHG

## 2024-08-15 DIAGNOSIS — S32.001S CLOSED BURST FRACTURE OF LUMBAR VERTEBRA, SEQUELA: ICD-10-CM

## 2024-08-15 DIAGNOSIS — I10 PRIMARY HYPERTENSION: Primary | ICD-10-CM

## 2024-08-15 DIAGNOSIS — S22.43XS CLOSED FRACTURE OF MULTIPLE RIBS OF BOTH SIDES, SEQUELA: ICD-10-CM

## 2024-08-15 DIAGNOSIS — G89.4 CHRONIC PAIN SYNDROME: ICD-10-CM

## 2024-08-15 DIAGNOSIS — S22.071A BURST FRACTURE OF T9 VERTEBRA (HCC): ICD-10-CM

## 2024-08-15 DIAGNOSIS — G82.20 PARAPLEGIA (HCC): ICD-10-CM

## 2024-08-15 DIAGNOSIS — S24.103S SPINAL CORD INJURY AT T9 LEVEL, SEQUELA (HCC): ICD-10-CM

## 2024-08-15 PROCEDURE — 1123F ACP DISCUSS/DSCN MKR DOCD: CPT | Performed by: STUDENT IN AN ORGANIZED HEALTH CARE EDUCATION/TRAINING PROGRAM

## 2024-08-15 PROCEDURE — 3078F DIAST BP <80 MM HG: CPT | Performed by: STUDENT IN AN ORGANIZED HEALTH CARE EDUCATION/TRAINING PROGRAM

## 2024-08-15 PROCEDURE — 99214 OFFICE O/P EST MOD 30 MIN: CPT | Performed by: STUDENT IN AN ORGANIZED HEALTH CARE EDUCATION/TRAINING PROGRAM

## 2024-08-15 PROCEDURE — 3074F SYST BP LT 130 MM HG: CPT | Performed by: STUDENT IN AN ORGANIZED HEALTH CARE EDUCATION/TRAINING PROGRAM

## 2024-08-15 RX ORDER — OXYCODONE HYDROCHLORIDE 5 MG/1
5 TABLET ORAL DAILY
Qty: 30 TABLET | Refills: 0 | Status: SHIPPED | OUTPATIENT
Start: 2024-11-15 | End: 2024-12-15

## 2024-08-15 RX ORDER — OXYCODONE HYDROCHLORIDE 5 MG/1
5 TABLET ORAL DAILY
Qty: 30 TABLET | Refills: 0 | Status: SHIPPED | OUTPATIENT
Start: 2024-09-15 | End: 2024-10-15

## 2024-08-15 RX ORDER — OXYCODONE HYDROCHLORIDE 5 MG/1
5 TABLET ORAL DAILY
Qty: 30 TABLET | Refills: 0 | Status: SHIPPED | OUTPATIENT
Start: 2024-10-15 | End: 2024-11-14

## 2024-08-15 RX ORDER — LISINOPRIL 10 MG/1
10 TABLET ORAL DAILY
Qty: 90 TABLET | Refills: 3 | Status: SHIPPED | OUTPATIENT
Start: 2024-08-15

## 2024-08-15 ASSESSMENT — ENCOUNTER SYMPTOMS
EYE PAIN: 0
CONSTIPATION: 0
VOMITING: 0
ABDOMINAL PAIN: 0
NAUSEA: 0
CHEST TIGHTNESS: 0
SHORTNESS OF BREATH: 0
DIARRHEA: 0
BACK PAIN: 0
SORE THROAT: 0
RHINORRHEA: 0
COUGH: 0

## 2024-08-15 NOTE — PROGRESS NOTES
Franklyn Campo is a 68 y.o. male (: 1955) presenting to address:    Chief Complaint   Patient presents with    Medication Check       Vitals:    08/15/24 1410   BP: 93/60   Pulse: 98   Resp: 16   Temp: 98.3 °F (36.8 °C)   SpO2: 97%       \"Have you been to the ER, urgent care clinic since your last visit?  Hospitalized since your last visit?\"    YES - When: approximately 3 months ago.  Where and Why: UTI.    “Have you seen or consulted any other health care providers outside of [facility] since your last visit?”    YES - When: approximately 3  weeks ago.  Where and Why: PT .    “Have you had a colorectal cancer screening such as a colonoscopy/FIT/Cologuard?    NO    No colonoscopy on file  No cologuard on file  No FIT/FOBT on file   No flexible sigmoidoscopy on file

## 2024-08-15 NOTE — PROGRESS NOTES
[facility], [place]      MR#: [id number]    HISTORY OF PRESENT ILLNESS  History of Present Illness  The patient is a 68-year-old male who presents for a follow-up visit.    He has been attending physical therapy sessions twice a week for the past two months, which have proven beneficial. Initially, one knee had a 30-degree flexion contracture and the other had a 32-degree flexion contracture, but therapy has reduced this to less than 15 degrees. He finds it comfortable to lie on his stomach and perform core muscle exercises. He also performs exercises with weights and resistance bands in bed and rotates his ankles to prevent fluid accumulation. He uses a 5-pound weight for balance and follows a 23-minute exercise video. He plans to discuss electronic stimulation for leg muscle atrophy with Dr. Jayant Soni and inquire about potential clinical trials.    He lives independently and is able to transfer in and out of bed and his car. He is currently taking oxycodone, which he finds effective, and lisinopril 10 mg for blood pressure control. He is not experiencing orthostatic hypotension and avoids sudden position changes. He once stopped taking lisinopril, during which time his systolic blood pressure will to the 140s, prompting him to resume the medication.    He has communicated with neurologist Dr. Goetz via email, who reviewed his chart and found no additional interventions necessary. He plans to schedule an appointment with urology next spring and has transferred his records from a previous urologist in [place].    Urology: [facility]   PMR: Dr. 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

## 2024-08-16 ENCOUNTER — TELEPHONE (OUTPATIENT)
Dept: FAMILY MEDICINE CLINIC | Facility: CLINIC | Age: 69
End: 2024-08-16

## 2024-08-16 NOTE — TELEPHONE ENCOUNTER
Glenys the pharmacist called stating that she had to cancel the order for oxy for November due to the amount for the [facility], only can do a 90 day supply. So just wanted pcp to be aware that pt is not going to have the November script.

## 2024-10-03 ENCOUNTER — TELEPHONE (OUTPATIENT)
Dept: FAMILY MEDICINE CLINIC | Facility: CLINIC | Age: 69
End: 2024-10-03

## 2024-10-03 NOTE — TELEPHONE ENCOUNTER
Spoke to patient to offer AWV before end of 2024 (with follow up appointment already scheduled for 02/19/2025.) Patient requested if there would be a penalty if not completed and advised there is no penalty. Patient declined AWV for 2024.     Updated February 2025 visit to include AWV w/ Follow Up for HBP

## 2024-11-14 DIAGNOSIS — S22.071A BURST FRACTURE OF T9 VERTEBRA (HCC): ICD-10-CM

## 2024-11-14 DIAGNOSIS — S22.43XS CLOSED FRACTURE OF MULTIPLE RIBS OF BOTH SIDES, SEQUELA: ICD-10-CM

## 2024-11-14 DIAGNOSIS — G89.4 CHRONIC PAIN SYNDROME: ICD-10-CM

## 2024-11-14 DIAGNOSIS — S32.001S CLOSED BURST FRACTURE OF LUMBAR VERTEBRA, SEQUELA: ICD-10-CM

## 2024-11-14 DIAGNOSIS — S24.103S SPINAL CORD INJURY AT T9 LEVEL, SEQUELA (HCC): ICD-10-CM

## 2024-11-14 DIAGNOSIS — G82.20 PARAPLEGIA (HCC): ICD-10-CM

## 2024-11-15 DIAGNOSIS — S22.071A BURST FRACTURE OF T9 VERTEBRA (HCC): ICD-10-CM

## 2024-11-15 DIAGNOSIS — G89.4 CHRONIC PAIN SYNDROME: ICD-10-CM

## 2024-11-15 DIAGNOSIS — S24.103S SPINAL CORD INJURY AT T9 LEVEL, SEQUELA (HCC): ICD-10-CM

## 2024-11-15 DIAGNOSIS — S22.43XS CLOSED FRACTURE OF MULTIPLE RIBS OF BOTH SIDES, SEQUELA: ICD-10-CM

## 2024-11-15 DIAGNOSIS — G82.20 PARAPLEGIA (HCC): ICD-10-CM

## 2024-11-15 DIAGNOSIS — S32.001S CLOSED BURST FRACTURE OF LUMBAR VERTEBRA, SEQUELA: ICD-10-CM

## 2024-11-15 RX ORDER — OXYCODONE HYDROCHLORIDE 5 MG/1
5 TABLET ORAL DAILY
Qty: 30 TABLET | Refills: 0 | Status: SHIPPED | OUTPATIENT
Start: 2024-11-15 | End: 2024-12-15

## 2024-11-15 RX ORDER — CYCLOBENZAPRINE HCL 5 MG
5 TABLET ORAL 3 TIMES DAILY PRN
Qty: 90 TABLET | Refills: 1 | Status: SHIPPED | OUTPATIENT
Start: 2024-11-15 | End: 2025-01-14

## 2024-11-15 RX ORDER — OXYCODONE HYDROCHLORIDE 5 MG/1
5 TABLET ORAL DAILY
Qty: 30 TABLET | Refills: 0 | OUTPATIENT
Start: 2024-11-15 | End: 2024-12-15

## 2024-12-17 DIAGNOSIS — S22.071A BURST FRACTURE OF T9 VERTEBRA (HCC): ICD-10-CM

## 2024-12-17 DIAGNOSIS — S22.43XS CLOSED FRACTURE OF MULTIPLE RIBS OF BOTH SIDES, SEQUELA: ICD-10-CM

## 2024-12-17 DIAGNOSIS — G89.4 CHRONIC PAIN SYNDROME: ICD-10-CM

## 2024-12-17 DIAGNOSIS — S32.001S CLOSED BURST FRACTURE OF LUMBAR VERTEBRA, SEQUELA: ICD-10-CM

## 2024-12-17 DIAGNOSIS — G82.20 PARAPLEGIA (HCC): ICD-10-CM

## 2024-12-17 DIAGNOSIS — S24.103S SPINAL CORD INJURY AT T9 LEVEL, SEQUELA (HCC): ICD-10-CM

## 2024-12-17 NOTE — TELEPHONE ENCOUNTER
Pt called requesting a refill to be sent to the [facility] ER on file.    Requested Prescriptions     Pending Prescriptions Disp Refills    oxyCODONE (ROXICODONE) 5 MG immediate release tablet 30 tablet 0     Sig: Take 1 tablet by mouth daily for 30 days. Intended supply: 3 days. Take lowest dose possible to manage pain Max Daily Amount: 5 mg

## 2024-12-18 RX ORDER — OXYCODONE HYDROCHLORIDE 5 MG/1
5 TABLET ORAL NIGHTLY
Qty: 30 TABLET | Refills: 0 | Status: SHIPPED | OUTPATIENT
Start: 2025-02-15 | End: 2025-03-17

## 2024-12-18 RX ORDER — OXYCODONE HYDROCHLORIDE 5 MG/1
5 TABLET ORAL NIGHTLY
Qty: 30 TABLET | Refills: 0 | Status: SHIPPED | OUTPATIENT
Start: 2024-12-18 | End: 2025-01-17

## 2024-12-18 RX ORDER — OXYCODONE HYDROCHLORIDE 5 MG/1
5 TABLET ORAL NIGHTLY
Qty: 30 TABLET | Refills: 0 | Status: SHIPPED | OUTPATIENT
Start: 2025-01-15 | End: 2025-02-14